# Patient Record
(demographics unavailable — no encounter records)

---

## 2024-11-26 NOTE — ASSESSMENT
[Arterial/Venous Disease] : arterial/venous disease [FreeTextEntry1] : 29 y/o F w/ RLE intermittently symptomatic varicose veins.  On exam, RLE with bulging varicose veins on medial thigh down to lateral thigh and lateral calf. No palpable cords or signs of phlebitis. No wounds. Small spider veins on both ankles, more prominent on R side. Mild R ankle edema.  Venous duplex showed RLE neg DVT or SVT. GSV competent. Superficial saphenous tributaries in thigh down to calf.  Findings discussed with pt and recommended conservative measured of daily compression stockings. Keep skin well moisturized. Avoid prologued periods of sitting or standing. Activity encouraged and staying well hydrated. Discussed stab phlebectomy if the veins are persistently symptomatic. Reassured no evidence of DVT or SVT. Pt to f/u prn.

## 2024-11-26 NOTE — HISTORY OF PRESENT ILLNESS
[FreeTextEntry1] : 31 y/o F referred by [facility]. She has no significant PMHx. She reports large bulging veins on the right leg. She explains the veins have become recently larger, more prominent than usual. She is currently pregnant and is concerned there is a blood clot. During other pregnancies, the veins have been somewhat bothersome but not as much as this pregnancy. Denies any previous DVT/SVT episodes, wounds, fever, chills, palpable cords. She wears compression stockings but not regularly.   SHx: -Never smoker -Mother of 3, currently pregnant.

## 2024-11-26 NOTE — PHYSICAL EXAM
[Respiratory Effort] : normal respiratory effort [2+] : left 2+ [Ankle Swelling (On Exam)] : present [Ankle Swelling On The Right] : mild [Varicose Veins Of Lower Extremities] : present [Varicose Veins Of The Right Leg] : of the right leg [Ankle Swelling On The Left] : moderate [No Rash or Lesion] : No rash or lesion [Alert] : alert [Oriented to Person] : oriented to person [Oriented to Place] : oriented to place [Oriented to Time] : oriented to time [Calm] : calm [] : not present [de-identified] : WD/WN [FreeTextEntry1] : RLE with bulging varicose veins on medial thigh down to lateral thigh and lateral calf. No palpable cords or signs of phlebitis. No wounds. Small spider veins on both ankles, more prominent on R side. Mild R ankle edema.  [de-identified] : FROM

## 2024-11-26 NOTE — PROCEDURE
[FreeTextEntry1] :  Venous duplex ordered to r/o insuff and eval vv, shows: RLE neg DVT or SVT. GSV competent. Superficial saphenous tributaries in thigh down to calf.